# Patient Record
Sex: MALE | Race: WHITE | NOT HISPANIC OR LATINO | Employment: FULL TIME | URBAN - METROPOLITAN AREA
[De-identification: names, ages, dates, MRNs, and addresses within clinical notes are randomized per-mention and may not be internally consistent; named-entity substitution may affect disease eponyms.]

---

## 2021-09-22 ENCOUNTER — HOSPITAL ENCOUNTER (EMERGENCY)
Facility: HOSPITAL | Age: 31
Discharge: HOME | End: 2021-09-22
Attending: EMERGENCY MEDICINE | Admitting: EMERGENCY MEDICINE

## 2021-09-22 VITALS
DIASTOLIC BLOOD PRESSURE: 88 MMHG | WEIGHT: 265 LBS | OXYGEN SATURATION: 100 % | SYSTOLIC BLOOD PRESSURE: 132 MMHG | RESPIRATION RATE: 16 BRPM | TEMPERATURE: 97.3 F | HEART RATE: 88 BPM

## 2021-09-22 DIAGNOSIS — L05.01 PILONIDAL CYST WITH ABSCESS: Primary | ICD-10-CM

## 2021-09-22 PROCEDURE — 99282 EMERGENCY DEPT VISIT SF MDM: CPT

## 2021-09-22 RX ORDER — CLINDAMYCIN HYDROCHLORIDE 300 MG/1
300 CAPSULE ORAL 4 TIMES DAILY
Qty: 28 CAPSULE | Refills: 0 | Status: SHIPPED | OUTPATIENT
Start: 2021-09-22 | End: 2021-10-21

## 2021-09-22 NOTE — DISCHARGE INSTRUCTIONS
Home, rest, medicine as prescribed, apply warm heat to the affected area, Tylenol or ibuprofen as needed for pain, follow up with surgeon for recheck and further evaluation. Return to care with further concerns.

## 2021-09-22 NOTE — ED PROVIDER NOTES
02:19 EDT  Patient seen and examined with physician assistant.  Briefly patient presents for evaluation of abscess.  Patient has had recurrent pilonidal abscess in the past.  States had increasing pain and then had spontaneous drainage at work.            On exam patient is alert cooperative in no distress.  Patient has drained pilonidal abscess.                Plan will be antibiotics and follow-up with surgeon.        MD ATTESTATION NOTE    The TAYLOR and I have discussed this patient's history, physical exam, and treatment plan.  I have reviewed the documentation and personally had a face to face interaction with the patient. I affirm the documentation and agree with the treatment and plan.  The attached note describes my personal findings.      Patient was wearing a face mask when I entered the room and they continued to wear a mask throughout their stay in the ED.  I wore PPE, including  gloves, face mask with shield or face mask with goggles whenever I was in the room with patient. n95 worn     David Demarco MD  09/23/21 7118

## 2021-09-22 NOTE — ED PROVIDER NOTES
EMERGENCY DEPARTMENT ENCOUNTER    Room Number:  08/08  Date of encounter:  9/22/2021  PCP: Provider, No Known  Historian: Patient      HPI:  Chief Complaint: Abscess      Context: Aroldo Myers is a 30 y.o. male with past medical history of hypertension and previous pilonidal cyst who presents to the ED c/o abscess.  Patient states they started to get increased pain, swelling, redness on his buttock about 2 weeks ago that then started to drain in the shower yesterday.  Patient denies any associated fever, chills, chest pain, or shortness of breath.  Patient states he had a similar episode about a year ago, but never followed up with a surgeon.      PAST MEDICAL HISTORY  Active Ambulatory Problems     Diagnosis Date Noted   • No Active Ambulatory Problems     Resolved Ambulatory Problems     Diagnosis Date Noted   • No Resolved Ambulatory Problems     No Additional Past Medical History         PAST SURGICAL HISTORY  No past surgical history on file.      FAMILY HISTORY  No family history on file.      SOCIAL HISTORY  Social History     Socioeconomic History   • Marital status: Single     Spouse name: Not on file   • Number of children: Not on file   • Years of education: Not on file   • Highest education level: Not on file         ALLERGIES  Patient has no known allergies.        REVIEW OF SYSTEMS  Review of Systems     All systems reviewed and negative except for those discussed in HPI.       PHYSICAL EXAM    I have reviewed the triage vital signs and nursing notes.    ED Triage Vitals [09/22/21 0055]   Temp Heart Rate Resp BP SpO2   97.3 °F (36.3 °C) 102 18 138/85 100 %      Temp src Heart Rate Source Patient Position BP Location FiO2 (%)   Tympanic Monitor -- -- --       Physical Exam  GENERAL: Alert and oriented x3, not distressed  HENT: nares patent  EYES: no scleral icterus  CV: regular rhythm, regular rate  RESPIRATORY: normal effort  ABDOMEN: soft/nontender, there is an area of discharge and location of  the pilonidal region with minimal erythema, there is no induration, thickening, pointing, or fluctuance  MUSCULOSKELETAL: no deformity  NEURO: alert, moves all extremities, follows commands  SKIN: warm, dry, see above        LAB RESULTS  No results found for this or any previous visit (from the past 24 hour(s)).    Ordered the above labs and independently reviewed the results.        RADIOLOGY  No Radiology Exams Resulted Within Past 24 Hours    I ordered the above noted radiological studies. Reviewed by me and discussed with radiologist.  See dictation for official radiology interpretation.      PROCEDURES    Procedures      MEDICATIONS GIVEN IN ER    Medications - No data to display      PROGRESS, DATA ANALYSIS, CONSULTS, AND MEDICAL DECISION MAKING    All labs have been independently reviewed by me.  All radiology studies have been reviewed by me and discussed with radiologist dictating the report.   EKG's independently viewed and interpreted by me.  Discussion below represents my analysis of pertinent findings related to patient's condition, differential diagnosis, treatment plan and final disposition.    DDx: Includes but not limited to abscess, pilonidal cyst, pilonidal abscess, fistula         MDM: Patient has an obvious and recurrent pilonidal cyst that appears to be infected with purulent drainage.  There is no evidence for a systemic infection with normal vital signs, and no fevers.  We will treat patient with antibiotics and give surgery referral.    PPE: The patient wore a surgical mask throughout the entire patient encounter. I wore an N95.    AS OF 03:04 EDT VITALS:    BP - 138/85  HR - 102  TEMP - 97.3 °F (36.3 °C) (Tympanic)  O2 SATS - 100%        DIAGNOSIS  Final diagnoses:   Pilonidal cyst with abscess         DISPOSITION  DISCHARGE    Patient discharged in stable condition.    Reviewed implications of results, diagnosis, meds, responsibility to follow up, warning signs and symptoms of possible  worsening, potential complications and reasons to return to ER.    Patient/Family voiced understanding of above instructions.    Discussed plan for discharge, as there is no emergent indication for admission. Patient referred to primary care provider for BP management due to today's BP. Pt/family is agreeable and understands need for follow up and repeat testing.  Pt is aware that discharge does not mean that nothing is wrong but it indicates no emergency is present that requires admission and they must continue care with follow-up as given below or physician of their choice.     FOLLOW-UP  Pao Dunham MD  [address] [address]  [phone]    Schedule an appointment as soon as possible for a visit            Medication List      New Prescriptions    clindamycin 300 MG capsule  Commonly known as: CLEOCIN  Take 1 capsule by mouth 4 (Four) Times a Day.           Where to Get Your Medications      You can get these medications from any pharmacy    Bring a paper prescription for each of these medications  · clindamycin 300 MG capsule                    Brock Gonzalez PA  09/22/21 0258       Brock Gonzalez PA  09/22/21 0304

## 2021-09-22 NOTE — ED TRIAGE NOTES
"Patient to ed from  with complaints of a \"cyst that busted at work\" patient rates pain 8/10, no fevers or chills. Abscess located near left buttock.     I wore full protective equipment throughout this patient encounter including a face mask, goggles, and gloves. Hand hygiene was performed before donning protective equipment and after removal when leaving the room.     "

## 2021-10-21 ENCOUNTER — OFFICE VISIT (OUTPATIENT)
Dept: SURGERY | Facility: CLINIC | Age: 31
End: 2021-10-21

## 2021-10-21 VITALS — WEIGHT: 258 LBS | HEIGHT: 70 IN | BODY MASS INDEX: 36.94 KG/M2

## 2021-10-21 DIAGNOSIS — L05.91 PILONIDAL CYST: Primary | ICD-10-CM

## 2021-10-21 PROCEDURE — 99204 OFFICE O/P NEW MOD 45 MIN: CPT | Performed by: SURGERY

## 2021-10-21 RX ORDER — AMOXICILLIN AND CLAVULANATE POTASSIUM 875; 125 MG/1; MG/1
1 TABLET, FILM COATED ORAL 2 TIMES DAILY
COMMUNITY

## 2021-10-21 RX ORDER — CEFAZOLIN SODIUM 2 G/100ML
2 INJECTION, SOLUTION INTRAVENOUS ONCE
Status: CANCELLED | OUTPATIENT
Start: 2021-11-05 | End: 2021-10-21

## 2021-11-01 NOTE — PROGRESS NOTES
General Surgery  Initial Office Visit    CC: Buttock cyst    HPI: The patient is a pleasant 30 y.o. year-old gentleman who presents today for evaluation of a chronic pilonidal cyst within his gluteal cleft that has been present for many years and has become infected at least 2-3 times according to his girlfriend who is with him here today.  Each time, he has had to have the cyst drained in the emergency room and his girlfriend has packed the wound to allow it to heal from the inside out.  The most recent episode occurred on 9/22 when he noticed severe pain of his tailbone with overlying skin erythema.  He came to the emergency room, but no procedure was performed as the cyst had already ruptured on its own with purulent output.  He was discharged home on oral Augmentin and instructed to come see me.    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, non-smoker, no regular alcohol use    ROS:   Constitutional: Negative for fevers or chills  HENT: Negative for hearing loss or runny nose  Eyes: Negative for vision changes or scleral icterus  Respiratory: Negative for cough or shortness of breath  Cardiovascular: Negative for chest pain or heart palpitations  Gastrointestinal: Negative for abdominal distension, nausea, vomiting, constipation, melena, or hematochezia  Genitourinary: Negative for hematuria or dysuria  Musculoskeletal: Negative for joint swelling or gait instability  Neurologic: Negative for tremors or seizures  Psychiatric: Negative for suicidal ideations or agitation  All other systems reviewed and negative    Physical Exam:  Height: 177 cm  Weight: 117 kg  BMI: 37  General: No acute distress, well-nourished & well-developed  HEAD:  normocephalic, atraumatic  EYES: normal conjunctiva, sclera anicteric  EARS: grossly normal hearing  NECK: supple, no thyromegaly  CARDIOVASCULAR: regular rate and rhythm  RESPIRATORY: clear to auscultation bilaterally  GASTROINTESTINAL: soft, nontender, non-distended  MUSCULOSKELETAL: normal gait and station. No gross extremity abnormalities  PSYCHIATRIC: oriented x3, normal mood and affect  SKIN: Pilonidal cyst with large tunneling sinus tract just above the anus within the lowest aspect of his gluteal cleft with no surrounding skin erythema or purulent output      ASSESSMENT & PLAN  Mr. Myers is a 30-year-old gentleman with a chronic pilonidal cyst within the inferior aspect of his gluteal cleft very close to his anus.  Given the recurrent nature of the pilonidal cyst abscess development, I would recommend we proceed with pilonidal cystectomy in the operating room at his earliest convenience.  Because of his obesity, he is at high risk for developing a wound infection or seroma which could cause the skin to breakdown.  For this reason, I discussed with him the possibility of leaving the wound open and having his girlfriend pack the wound twice daily with normal saline wet-to-dry dressing changes.  She has already done this in the past with his previous infections and feels comfortable doing so.  This would allow him to get back to work quicker, and would afford him a lower risk for pilonidal cyst recurrence.  He is in agreement with this plan.  He would like to proceed with pilonidal cystectomy as soon as possible and I am happy to get it arranged at his earliest convenience.    Pao Dunham MD  General, Robotic, and Endoscopic Surgery  [facility]    [address], [address]  [place] [address]  P: [phone]  F: [phone]

## 2021-11-04 ENCOUNTER — TELEPHONE (OUTPATIENT)
Dept: SURGERY | Facility: CLINIC | Age: 31
End: 2021-11-04

## 2021-11-04 NOTE — TELEPHONE ENCOUNTER
Attempted to reach patient again. Left voicemail letting him know that his surgery is cancelled and he can call when he is ready to reschedule.

## 2021-11-04 NOTE — TELEPHONE ENCOUNTER
Left voicemail regarding patient's missed PAT appointment. He is scheduled for surgery 11/5/21. Notified him that if we do not hear from him, his surgery will be cancelled.